# Patient Record
(demographics unavailable — no encounter records)

---

## 2025-02-18 NOTE — END OF VISIT
[Time Spent: ___ minutes] : I have spent [unfilled] minutes of time on the encounter which excludes teaching and separately reported services. [FreeTextEntry3] :  All medical record entries made by my scribe were at my, Dr. Yann Oreilly, direction and personally dictated by me. I have reviewed the chart and agree that the record accurately reflects my personal performance of the history, physical exam, assessment and plan. I have also personally directed, reviewed, and agreed with the chart.

## 2025-02-18 NOTE — HISTORY OF PRESENT ILLNESS
[FreeTextEntry1] : Patient is a very pleasant 35-year-old woman with no major past medical history who underwent  3 weeks ago for a healthy boy. Prior to , patient had preeclampsia. Patient was referred to my office for palpitations and PVCs. Event monitor after delivery found 14% PVC burden. Patient feels occasional palpitations, no chest pain, shortness of breath, nausea, or vomiting. Patient comes to the office for further evaluation.    Patient comes to the office today for follow-up. Patient's symptoms have significantly improved.    EKG (2025): Sinus rhythm at 107 bpm, PVCs, RBBB, transition in V3, superior axis, positive for I and AVL.    Echo (2024): EF of 62%, no major valvular disease.   Event monitor (2024): Average HR of 100 bpm, 7.5% PVC burden.   EKG (12/15/2023): Sinus rhythm at 86 bpm, no PVCs.    Stress test (2023): Total exercise time of 8 minutes. Maximum heart rate of 157 bpm, 84% of MPHR. Occasional PVCs. No ischemic response through EKG and symptoms.   Echo (2023): Normal LV function, EF of 64%, no major valvular disease.   Event monitor (2023): 1% total PVC count. 1 episode of 3 beats non-sustained VT. Occurred at 11 o'clock in the afternoon, asymptomatic.   EKG (10/17/2023): Sinus rhythm at 79 bpm with PVC, likely originating from left anterior papillary muscle.  Event monitor (2023): Average heart rate of 97 bpm, PVCs. Total PVC burden 14% No other major arrhythmias recorder.   Echocardiogram (2023): Normal ejection fraction, no major valvular disease, mild to moderate MR.

## 2025-02-18 NOTE — ADDENDUM
[FreeTextEntry1] : Kirsten LEPE assisted in documentation on 02/18/2025   acting as a scribe for Dr. Yann Oreilly.

## 2025-02-18 NOTE — ASSESSMENT
[FreeTextEntry1] : PVCs    - Patient has a normal a normal EF and negative stress test. At this time, I recommend no further workup. Patient was unable to undergo MRI due to claustrophobia.     HTN - Continue Metoprolol 50 mg once a day.   - Continue Nifedipine 30 mg once a day.

## 2025-07-09 NOTE — END OF VISIT
[FreeTextEntry3] :  All medical record entries made by my scribe were at my, Dr. Yann Oreilly, direction and personally dictated by me. I have reviewed the chart and agree that the record accurately reflects my personal performance of the history, physical exam, assessment and plan. I have also personally directed, reviewed, and agreed with the chart.  [Time Spent: ___ minutes] : I have spent [unfilled] minutes of time on the encounter which excludes teaching and separately reported services.

## 2025-07-09 NOTE — ADDENDUM
[FreeTextEntry1] : I, Zita Mederos, assisted in documentation on 07/03/2025 acting as a scribe for Dr. Yann Oreilly.

## 2025-07-10 NOTE — HISTORY OF PRESENT ILLNESS
[FreeTextEntry1] : Patient is a 37-year-old woman with no major past medical history who underwent , eclampsia, patient had preeclampsia palpitations and PVCs. Event monitor after delivery found 14% PVC burden.   EKG (2025): Sinus rhythm at 95 bpm. Stress test (2025): Normal ejection fraction without evidence of ischemia.  Echo (2024): EF of 62%, no major valvular disease.   Event monitor (2024): Average HR of 100 bpm, 7.5% PVC burden.    Pt presents for risk factor management. Says she started gaining weight when she stopped being active, bur was always overweight. She is on compounded semaglutide since 2024, she lost 40lbs, but now gained 10lbs back and not losing anymore, she is on max dose of 2.65mg; contrave was now added. Admits she does not exercise, too busy with her young child. She tries to watch what she eats but does have junk food.                           always been overweifht, got worse when stopped playing sports  didnt change her diet just eating less no gallbladder  eats cheese, bread, sweets, candies, diet soda,

## 2025-07-10 NOTE — ASSESSMENT
[FreeTextEntry1] : PVCs  HTN Obesity   Plan: Switch from semaglutide to tirzepatide, she will discuss with provider who prescribes her compounded GLP1 Dietary changes and exercise regimen discussed at length Follow up 2 months

## 2025-07-12 NOTE — HISTORY OF PRESENT ILLNESS
[FreeTextEntry1] : Patient is a very pleasant 35-year-old woman with no major past medical history who underwent  3 weeks ago for a healthy boy. Prior to , patient had preeclampsia. Patient was referred to my office for palpitations and PVCs. Event monitor after delivery found 14% PVC burden. Patient feels occasional palpitations, no chest pain, shortness of breath, nausea, or vomiting. Patient comes to the office for further evaluation.    2025: Patient comes to the office for follow-up. Patient is doing great, palpitations improved. Patient is trying to lose weight.   EKG (2025): Sinus rhythm at 95 bpm. Stress test (2025): Normal ejection fraction without evidence of ischemia.  EKG (2025): Sinus rhythm at 107 bpm, PVCs, RBBB, transition in V3, superior axis, positive for I and AVL.    Echo (2024): EF of 62%, no major valvular disease.   Event monitor (2024): Average HR of 100 bpm, 7.5% PVC burden.   EKG (12/15/2023): Sinus rhythm at 86 bpm, no PVCs.    Stress test (2023): Total exercise time of 8 minutes. Maximum heart rate of 157 bpm, 84% of MPHR. Occasional PVCs. No ischemic response through EKG and symptoms.   Echo (2023): Normal LV function, EF of 64%, no major valvular disease.   Event monitor (2023): 1% total PVC count. 1 episode of 3 beats non-sustained VT. Occurred at 11 o'clock in the afternoon, asymptomatic.   EKG (10/17/2023): Sinus rhythm at 79 bpm with PVC, likely originating from left anterior papillary muscle.  Event monitor (2023): Average heart rate of 97 bpm, PVCs. Total PVC burden 14% No other major arrhythmias recorder.   Echocardiogram (2023): Normal ejection fraction, no major valvular disease, mild to moderate MR.

## 2025-07-12 NOTE — ASSESSMENT
[FreeTextEntry1] : PVCs    - Patient has a normal a normal EF and negative stress test. At this time, I recommend no further workup. Patient was unable to undergo MRI due to claustrophobia.   - Event monitor next visit. Patient doesn't want to wear event monitor during the summer. To evaluate PVC burden. - Refer patient to preventive cardiology to assist patient with possible weight loss.   HTN - Continue Metoprolol 50 mg once a day.   - Continue Nifedipine 30 mg once a day.